# Patient Record
Sex: FEMALE | Race: WHITE | URBAN - METROPOLITAN AREA
[De-identification: names, ages, dates, MRNs, and addresses within clinical notes are randomized per-mention and may not be internally consistent; named-entity substitution may affect disease eponyms.]

---

## 2017-03-02 ENCOUNTER — OFFICE VISIT (OUTPATIENT)
Dept: FAMILY MEDICINE CLINIC | Facility: CLINIC | Age: 27
End: 2017-03-02

## 2017-03-02 VITALS
TEMPERATURE: 99 F | OXYGEN SATURATION: 99 % | DIASTOLIC BLOOD PRESSURE: 70 MMHG | SYSTOLIC BLOOD PRESSURE: 100 MMHG | HEART RATE: 72 BPM | RESPIRATION RATE: 16 BRPM

## 2017-03-02 DIAGNOSIS — R30.0 DYSURIA: ICD-10-CM

## 2017-03-02 DIAGNOSIS — N30.01 ACUTE CYSTITIS WITH HEMATURIA: Primary | ICD-10-CM

## 2017-03-02 LAB
BILIRUBIN: NEGATIVE
GLUCOSE (URINE DIPSTICK): NEGATIVE MG/DL
KETONES (URINE DIPSTICK): NEGATIVE MG/DL
MULTISTIX LOT#: ABNORMAL NUMERIC
NITRITE, URINE: NEGATIVE
PH, URINE: 6.5 (ref 4.5–8)
PROTEIN (URINE DIPSTICK): NEGATIVE MG/DL
SPECIFIC GRAVITY: 1.01 (ref 1–1.03)
URINE-COLOR: YELLOW
UROBILINOGEN,SEMI-QN: 0.2 MG/DL (ref 0–1.9)

## 2017-03-02 PROCEDURE — 81003 URINALYSIS AUTO W/O SCOPE: CPT | Performed by: NURSE PRACTITIONER

## 2017-03-02 PROCEDURE — 99213 OFFICE O/P EST LOW 20 MIN: CPT | Performed by: NURSE PRACTITIONER

## 2017-03-02 RX ORDER — NITROFURANTOIN 25; 75 MG/1; MG/1
100 CAPSULE ORAL 2 TIMES DAILY
Qty: 10 CAPSULE | Refills: 0 | Status: SHIPPED | OUTPATIENT
Start: 2017-03-02 | End: 2017-03-07

## 2017-03-02 NOTE — PROGRESS NOTES
CHIEF COMPLAINT:   Patient presents with:  Dysuria: started last week. got better. then got worse tuesday. burning, urgency. felt ok yesterday but work up this morning at 0400 and very painful. no known fevers. feels ok othewise. no back pain or pelvic. Negative   KETONES (URINE DIPSTICK) Negative Negative mg/dL   SPECIFIC GRAVITY 1.010 1.005 - 1.030   OCCULT BLOOD small Negative   PH, URINE 6.5 4.5 - 8.0   PROTEIN (URINE DIPSTICK) negative Negative/Trace mg/dL   UROBILINOGEN,SEMI-QN 0.2 0.0 - 1.9 mg/dL

## 2017-04-17 ENCOUNTER — OFFICE VISIT (OUTPATIENT)
Dept: FAMILY MEDICINE CLINIC | Facility: CLINIC | Age: 27
End: 2017-04-17

## 2017-04-17 VITALS — OXYGEN SATURATION: 99 % | RESPIRATION RATE: 18 BRPM | TEMPERATURE: 98 F | HEART RATE: 64 BPM

## 2017-04-17 DIAGNOSIS — R30.0 DYSURIA: Primary | ICD-10-CM

## 2017-04-17 PROCEDURE — 87186 SC STD MICRODIL/AGAR DIL: CPT

## 2017-04-17 PROCEDURE — 87088 URINE BACTERIA CULTURE: CPT

## 2017-04-17 PROCEDURE — 87086 URINE CULTURE/COLONY COUNT: CPT

## 2017-04-17 PROCEDURE — 81003 URINALYSIS AUTO W/O SCOPE: CPT

## 2017-04-17 PROCEDURE — 99212 OFFICE O/P EST SF 10 MIN: CPT

## 2017-04-17 RX ORDER — NITROFURANTOIN 25; 75 MG/1; MG/1
CAPSULE ORAL
Qty: 40 CAPSULE | Refills: 0 | Status: SHIPPED | OUTPATIENT
Start: 2017-04-17

## 2017-04-18 ENCOUNTER — TELEPHONE (OUTPATIENT)
Dept: FAMILY MEDICINE CLINIC | Facility: CLINIC | Age: 27
End: 2017-04-18

## 2017-04-18 NOTE — TELEPHONE ENCOUNTER
Sick Call/follow up: Called to see how she has doing. LMTCB with questions & if worse, should go to the Emergency  Dept.  Via message-Asked pt if she used Protein Drink or supplements, to see if that was a compounding factor to dysuria, cystitis, or frequ

## 2017-04-18 NOTE — PROGRESS NOTES
Karyn Haynes is a 32year old female. CHIEF COMPLAINT:   Patient presents with:  UTI      HPI:   Patient presents with symptoms of UTI.  Reports <1 day history of urinary frequency and dysuria, which is worse at end of urination or if urinating smaller a SPECIFIC GRAVITY 1.005 - 1.030  >1.030 1.010      OCCULT BLOOD Negative  Moderate (A) small      PH, URINE 4.5 - 8.0  6.0 6.5      PROTEIN (URINE DIPSTICK) Negative/Trace mg/dL 100mg (A) negative      UROBILINOGEN,SEMI-QN 0.0 - 1.9 mg/dL 0.2 0.2      NITR 10) Call your clinic or health care provider if symptoms are not resolved by the end of the antibiotic. 11) Follow up with personal doctor for test of cure/repeat urine testing and possible further evaluation.           The patient indicates understanding

## (undated) NOTE — MR AVS SNAPSHOT
[facility]  [place] [address]  [phone]               Thank you for choosing us for your health care visit with Trev Jackson NP.   We are glad to serve you and happy to provide you with this summary of your vis information, go to [email or web address] and click on the Sign Up Now link in the [employer] box. Enter your [employer] Activation Code exactly as it appears below along with your Zip Code and Date of Birth to complete the sign-up process.  If you do